# Patient Record
(demographics unavailable — no encounter records)

---

## 2025-04-24 NOTE — PLAN
[FreeTextEntry1] : Examination Pap smear was done.  Patient was given prescription for mammogram sonogram.

## 2025-04-24 NOTE — PHYSICAL EXAM
[Chaperoned Physical Exam] : A chaperone was present in the examining room during all aspects of the physical examination. [FreeTextEntry2] : Iona Meade [Appropriately responsive] : appropriately responsive [Alert] : alert [No Acute Distress] : no acute distress [No Lymphadenopathy] : no lymphadenopathy [Regular Rate Rhythm] : regular rate rhythm [No Murmurs] : no murmurs [Clear to Auscultation B/L] : clear to auscultation bilaterally [Soft] : soft [Non-tender] : non-tender [Non-distended] : non-distended [No HSM] : No HSM [No Lesions] : no lesions [No Mass] : no mass [Oriented x3] : oriented x3 [Examination Of The Breasts] : a normal appearance [No Masses] : no breast masses were palpable [Labia Majora] : normal [Labia Minora] : normal [Normal] : normal [Uterine Adnexae] : normal

## 2025-04-24 NOTE — HISTORY OF PRESENT ILLNESS
[TextBox_4] : 56-year-old patient for annual gynecological exam without any gynecological complaints [Mammogramdate] : 2024 [PapSmeardate] : 2024 [BoneDensityDate] : 2024 [ColonoscopyDate] : 2023 [Currently In Menopause] : currently in menopause [Menopause Age: ____] : age at menopause was [unfilled]